# Patient Record
(demographics unavailable — no encounter records)

---

## 2025-04-10 NOTE — PHYSICAL EXAM

## 2025-04-10 NOTE — RISK ASSESSMENT
[Little interest or pleasure doing things] : 1) Little interest or pleasure doing things [Feeling down, depressed, or hopeless] : 2) Feeling down, depressed, or hopeless [1] : 2) Feeling down, depressed, or hopeless for several days (1) [PHQ-9 Negative - No further assessment needed] : PHQ-9 Negative - No further assessment needed

## 2025-04-10 NOTE — DISCUSSION/SUMMARY
[Normal Growth] : growth [Normal Development] : development  [No Elimination Concerns] : elimination [Continue Regimen] : feeding [No Skin Concerns] : skin [Normal Sleep Pattern] : sleep [None] : no medical problems [Anticipatory Guidance Given] : Anticipatory guidance addressed as per the history of present illness section [No Medications] : ~He/She~ is not on any medications [Patient] : patient [Parent/Guardian] : Parent/Guardian [Physical Growth and Development] : physical growth and development [Social and Academic Competence] : social and academic competence [Emotional Well-Being] : emotional well-being [Risk Reduction] : risk reduction [Violence and Injury Prevention] : violence and injury prevention [Full Activity without restrictions including Physical Education & Athletics] : Full Activity without restrictions including Physical Education & Athletics [I have examined the above-named student and completed the preparticipation physical evaluation. The athlete does not present apparent clinical contraindications to practice and participate in sport(s) as outlined above. A copy of the physical exam is on r] : I have examined the above-named student and completed the preparticipation physical evaluation. The athlete does not present apparent clinical contraindications to practice and participate in sport(s) as outlined above. A copy of the physical exam is on record in my office and can be made available to the school at the request of the parents. If conditions arise after the athlete has been cleared for participation, the physician may rescind the clearance until the problem is resolved and the potential consequences are completely explained to the athlete (and parents/guardians). [] : The components of the vaccine(s) to be administered today are listed in the plan of care. The disease(s) for which the vaccine(s) are intended to prevent and the risks have been discussed with the caretaker.  The risks are also included in the appropriate vaccination information statements which have been provided to the patient's caregiver.  The caregiver has given consent to vaccinate. [FreeTextEntry6] : HPV finished today  [FreeTextEntry1] : 13yr old well adolescent teen boy, no major issues been healthy finished HPV series today counseled on no TV before bedtime (1hr of TV daily as it's in his bedroom) and interfering with sleep/concentration as he marked on his questionnaire. mom agreed.

## 2025-04-10 NOTE — HISTORY OF PRESENT ILLNESS
[Mother] : mother [Yes] : Patient goes to dentist yearly [Toothpaste] : Primary Fluoride Source: Toothpaste [Up to date] : Up to date [Needs Immunizations] : Needs immunizations [Eats meals with family] : eats meals with family [Has family members/adults to turn to for help] : has family members/adults to turn to for help [Is permitted and is able to make independent decisions] : Is permitted and is able to make independent decisions [Sleep Concerns] : no sleep concerns [Grade: ____] : Grade: [unfilled] [Normal Performance] : normal performance [Normal Behavior/Attention] : normal behavior/attention [Normal Homework] : normal homework [Eats regular meals including adequate fruits and vegetables] : eats regular meals including adequate fruits and vegetables [Drinks non-sweetened liquids] : drinks non-sweetened liquids  [Calcium source] : calcium source [Has concerns about body or appearance] : does not have concerns about body or appearance [Has friends] : has friends [At least 1 hour of physical activity a day] : at least 1 hour of physical activity a day [Screen time (except homework) less than 2 hours a day] : no screen time (except homework) less than 2 hours a day [Has interests/participates in community activities/volunteers] : has interests/participates in community activities/volunteers. [Uses electronic nicotine delivery system] : does not use electronic nicotine delivery system [Exposure to electronic nicotine delivery system] : no exposure to electronic nicotine delivery system [Uses tobacco] : does not use tobacco [Exposure to tobacco] : no exposure to tobacco [Uses drugs] : does not use drugs  [Exposure to drugs] : no exposure to drugs [Drinks alcohol] : does not drink alcohol [Exposure to alcohol] : no exposure to alcohol [No] : No cigarette smoke exposure [Uses safety belts/safety equipment] : uses safety belts/safety equipment  [Has ways to cope with stress] : has ways to cope with stress [Displays self-confidence] : displays self-confidence [Has problems with sleep] : does not have problems with sleep [Gets depressed, anxious, or irritable/has mood swings] : does not get depressed, anxious, or irritable/has mood swings [Has thought about hurting self or considered suicide] : has not thought about hurting self or considered suicide [FreeTextEntry7] : Anupam is 13yr old now, doing well, been healthy this winter.  [de-identified] : gets disappointed if he thinks he 'd get better grade then it turns out no to be the case  [de-identified] : about 7-9hr per week varies from weeknights to weekends. does karate 2x per week  [FreeTextEntry1] : 13yr old young adolescent, doing well, no issues

## 2025-06-05 NOTE — PHYSICAL EXAM
[de-identified] : large linear erythematous raised plaque on R forearm and R upper arm; smaller areas of erythematous patches on L arm and R arm. Erythematous papules on R cheek.

## 2025-06-05 NOTE — HISTORY OF PRESENT ILLNESS
[de-identified] : Rash [FreeTextEntry6] : About 2 weeks ago Anupam started to have red bumpy patches on his skin. Last year in April he had a similar episode at which point he was diagnosed with a virus and also had mono. This time, the only inciting factor is that he was playing in a field a few weeks ago. Mother thought it was poison ivy. They have been applying benadryl topical to the lesions with some relief. They are itchy.  He is using coconut soap, tide detergent, wool balls.  Denies fever, swelling of face, cough, congestion or runny nose.

## 2025-06-05 NOTE — PHYSICAL EXAM
[de-identified] : large linear erythematous raised plaque on R forearm and R upper arm; smaller areas of erythematous patches on L arm and R arm. Erythematous papules on R cheek.

## 2025-06-05 NOTE — DISCUSSION/SUMMARY
[FreeTextEntry1] : Likely contact dermatitis; exam limited by telemed interface - Advise cerave baby soap, no wool balls, switch to free and clear tide - triamcinolone 0.1 bid x 1 week to AA (except face) - use gentle facial cleanser and moisturizer for facial lesions - derm referral placed - advise to f/u within 1 week

## 2025-06-05 NOTE — HISTORY OF PRESENT ILLNESS
[de-identified] : Rash [FreeTextEntry6] : About 2 weeks ago Anupam started to have red bumpy patches on his skin. Last year in April he had a similar episode at which point he was diagnosed with a virus and also had mono. This time, the only inciting factor is that he was playing in a field a few weeks ago. Mother thought it was poison ivy. They have been applying benadryl topical to the lesions with some relief. They are itchy.  He is using coconut soap, tide detergent, wool balls.  Denies fever, swelling of face, cough, congestion or runny nose.